# Patient Record
Sex: MALE | Race: ASIAN | NOT HISPANIC OR LATINO
[De-identification: names, ages, dates, MRNs, and addresses within clinical notes are randomized per-mention and may not be internally consistent; named-entity substitution may affect disease eponyms.]

---

## 2021-01-01 ENCOUNTER — LABORATORY RESULT (OUTPATIENT)
Age: 71
End: 2021-01-01

## 2021-01-01 ENCOUNTER — OUTPATIENT (OUTPATIENT)
Dept: OUTPATIENT SERVICES | Facility: HOSPITAL | Age: 71
LOS: 1 days | End: 2021-01-01

## 2021-01-01 ENCOUNTER — RESULT REVIEW (OUTPATIENT)
Age: 71
End: 2021-01-01

## 2021-01-01 ENCOUNTER — APPOINTMENT (OUTPATIENT)
Dept: HEART AND VASCULAR | Facility: CLINIC | Age: 71
End: 2021-01-01
Payer: MEDICAID

## 2021-01-01 ENCOUNTER — NON-APPOINTMENT (OUTPATIENT)
Age: 71
End: 2021-01-01

## 2021-01-01 ENCOUNTER — APPOINTMENT (OUTPATIENT)
Dept: FAMILY MEDICINE | Facility: CLINIC | Age: 71
End: 2021-01-01
Payer: MEDICAID

## 2021-01-01 ENCOUNTER — APPOINTMENT (OUTPATIENT)
Dept: RADIOLOGY | Facility: CLINIC | Age: 71
End: 2021-01-01
Payer: MEDICAID

## 2021-01-01 ENCOUNTER — APPOINTMENT (OUTPATIENT)
Dept: RHEUMATOLOGY | Facility: CLINIC | Age: 71
End: 2021-01-01
Payer: MEDICAID

## 2021-01-01 VITALS
TEMPERATURE: 97.5 F | OXYGEN SATURATION: 98 % | HEIGHT: 68 IN | BODY MASS INDEX: 24.86 KG/M2 | DIASTOLIC BLOOD PRESSURE: 81 MMHG | WEIGHT: 164 LBS | HEART RATE: 77 BPM | SYSTOLIC BLOOD PRESSURE: 134 MMHG

## 2021-01-01 VITALS
BODY MASS INDEX: 23.95 KG/M2 | HEART RATE: 61 BPM | SYSTOLIC BLOOD PRESSURE: 132 MMHG | HEIGHT: 68 IN | DIASTOLIC BLOOD PRESSURE: 82 MMHG | TEMPERATURE: 97.9 F | WEIGHT: 158 LBS | OXYGEN SATURATION: 96 %

## 2021-01-01 VITALS
HEART RATE: 85 BPM | WEIGHT: 158 LBS | TEMPERATURE: 97.5 F | OXYGEN SATURATION: 100 % | HEIGHT: 68 IN | SYSTOLIC BLOOD PRESSURE: 144 MMHG | BODY MASS INDEX: 23.95 KG/M2 | DIASTOLIC BLOOD PRESSURE: 90 MMHG

## 2021-01-01 DIAGNOSIS — Z86.73 PERSONAL HISTORY OF TRANSIENT ISCHEMIC ATTACK (TIA), AND CEREBRAL INFARCTION W/OUT RESIDUAL DEFICITS: ICD-10-CM

## 2021-01-01 DIAGNOSIS — Z00.00 ENCOUNTER FOR GENERAL ADULT MEDICAL EXAMINATION W/OUT ABNORMAL FINDINGS: ICD-10-CM

## 2021-01-01 LAB
25(OH)D3 SERPL-MCNC: 22.4 NG/ML
ALBUMIN SERPL ELPH-MCNC: 4.3 G/DL
ALP BLD-CCNC: 105 U/L
ALT SERPL-CCNC: 21 U/L
ANION GAP SERPL CALC-SCNC: 15 MMOL/L
AST SERPL-CCNC: 19 U/L
BASOPHILS # BLD AUTO: 0.06 K/UL
BASOPHILS NFR BLD AUTO: 0.9 %
BILIRUB SERPL-MCNC: 0.3 MG/DL
BUN SERPL-MCNC: 24 MG/DL
CALCIUM SERPL-MCNC: 9.6 MG/DL
CCP AB SER IA-ACNC: 154 UNITS
CHLORIDE SERPL-SCNC: 104 MMOL/L
CHOLEST SERPL-MCNC: 201 MG/DL
CO2 SERPL-SCNC: 21 MMOL/L
CREAT SERPL-MCNC: 1.54 MG/DL
EOSINOPHIL # BLD AUTO: 0.89 K/UL
EOSINOPHIL NFR BLD AUTO: 13.4 %
ESTIMATED AVERAGE GLUCOSE: 120 MG/DL
GLUCOSE SERPL-MCNC: 100 MG/DL
HBA1C MFR BLD HPLC: 5.8 %
HCT VFR BLD CALC: 40.6 %
HDLC SERPL-MCNC: 41 MG/DL
HGB BLD-MCNC: 12.7 G/DL
LDLC SERPL CALC-MCNC: 119 MG/DL
LYMPHOCYTES # BLD AUTO: 0.71 K/UL
LYMPHOCYTES NFR BLD AUTO: 10.7 %
MAN DIFF?: NORMAL
MCHC RBC-ENTMCNC: 31.3 GM/DL
MCHC RBC-ENTMCNC: 33.6 PG
MCV RBC AUTO: 107.4 FL
MONOCYTES # BLD AUTO: 0.53 K/UL
MONOCYTES NFR BLD AUTO: 8 %
NEUTROPHILS # BLD AUTO: 4.47 K/UL
NEUTROPHILS NFR BLD AUTO: 67 %
NONHDLC SERPL-MCNC: 160 MG/DL
PLATELET # BLD AUTO: 352 K/UL
POTASSIUM SERPL-SCNC: 4.8 MMOL/L
PROT SERPL-MCNC: 7.2 G/DL
RBC # BLD: 3.78 M/UL
RBC # FLD: 15.5 %
RF+CCP IGG SER-IMP: ABNORMAL
RHEUMATOID FACT SER QL: 50 IU/ML
SODIUM SERPL-SCNC: 139 MMOL/L
TRIGL SERPL-MCNC: 203 MG/DL
TSH SERPL-ACNC: 0.66 UIU/ML
WBC # FLD AUTO: 6.67 K/UL

## 2021-01-01 PROCEDURE — 99204 OFFICE O/P NEW MOD 45 MIN: CPT

## 2021-01-01 PROCEDURE — 99387 INIT PM E/M NEW PAT 65+ YRS: CPT | Mod: 25

## 2021-01-01 PROCEDURE — 99204 OFFICE O/P NEW MOD 45 MIN: CPT | Mod: 25

## 2021-01-01 PROCEDURE — 73120 X-RAY EXAM OF HAND: CPT | Mod: 26,50

## 2021-01-01 PROCEDURE — 93000 ELECTROCARDIOGRAM COMPLETE: CPT

## 2021-01-01 PROCEDURE — 99407 BEHAV CHNG SMOKING > 10 MIN: CPT

## 2021-01-01 PROCEDURE — G0439: CPT | Mod: 25

## 2021-01-01 RX ORDER — GLUC/MSM/COLGN2/HYAL/ANTIARTH3 375-375-20
TABLET ORAL
Refills: 0 | Status: ACTIVE | COMMUNITY

## 2021-01-01 RX ORDER — SODIUM BICARBONATE
POWDER (GRAM) MISCELLANEOUS
Refills: 0 | Status: ACTIVE | COMMUNITY

## 2021-01-01 RX ORDER — WALKER
EACH MISCELLANEOUS
Qty: 1 | Refills: 0 | Status: ACTIVE | COMMUNITY
Start: 2021-01-01 | End: 1900-01-01

## 2021-01-01 RX ORDER — TRAMADOL HYDROCHLORIDE 25 MG/1
TABLET, COATED ORAL
Refills: 0 | Status: ACTIVE | COMMUNITY

## 2021-01-01 RX ORDER — METHOTREXATE 2.5 MG/1
2.5 TABLET ORAL
Refills: 0 | Status: DISCONTINUED | COMMUNITY
End: 2021-01-01

## 2021-11-18 PROBLEM — Z86.73 HISTORY OF STROKE IN ADULTHOOD: Status: ACTIVE | Noted: 2021-01-01

## 2021-11-18 PROBLEM — Z00.00 ENCOUNTER FOR PREVENTIVE HEALTH EXAMINATION: Status: ACTIVE | Noted: 2021-01-01

## 2021-11-18 NOTE — COUNSELING
[Behavioral health counseling provided] : Behavioral health counseling provided [Risk of tobacco use and health benefits of smoking cessation discussed] : Risk of tobacco use and health benefits of smoking cessation discussed [Cessation strategies including cessation program discussed] : Cessation strategies including cessation program discussed [Use of nicotine replacement therapies and other medications discussed] : Use of nicotine replacement therapies and other medications discussed [Encouraged to pick a quit date and identify support needed to quit] : Encouraged to pick a quit date and identify support needed to quit [Tobacco Use Cessation Intensive Greater Than 10 Minutes] : Tobacco Use Cessation Intensive Greater Than 10 Minutes [FreeTextEntry3] : 10

## 2021-11-18 NOTE — PHYSICAL EXAM
[Normal Sclera/Conjunctiva] : normal sclera/conjunctiva [Normal Outer Ear/Nose] : the outer ears and nose were normal in appearance [Normal] : affect was normal and insight and judgment were intact [de-identified] : with ostomy bag on left side, c/d/i around ostomy site

## 2021-11-18 NOTE — HEALTH RISK ASSESSMENT
[Good] : ~his/her~  mood as  good [] : Yes [20 or more] : 20 or more [1 or 2 (0 pts)] : 1 or 2 (0 points) [Never (0 pts)] : Never (0 points) [No] : In the past 12 months have you used drugs other than those required for medical reasons? No [0] : 2) Feeling down, depressed, or hopeless: Not at all (0) [PHQ-2 Negative - No further assessment needed] : PHQ-2 Negative - No further assessment needed [Patient declined colonoscopy] : Patient declined colonoscopy [HIV test declined] : HIV test declined [Hepatitis C test declined] : Hepatitis C test declined [None] : None [With Family] : lives with family [Retired] : retired [Single] : single [Feels Safe at Home] : Feels safe at home [Fully functional (bathing, dressing, toileting, transferring, walking, feeding)] : Fully functional (bathing, dressing, toileting, transferring, walking, feeding) [Fully functional (using the telephone, shopping, preparing meals, housekeeping, doing laundry, using] : Fully functional and needs no help or supervision to perform IADLs (using the telephone, shopping, preparing meals, housekeeping, doing laundry, using transportation, managing medications and managing finances) [YearQuit] : 4 [de-identified] : limited  [de-identified] : fruits, veggies  [YFC1Eeibr] : 0 [Change in mental status noted] : No change in mental status noted [Language] : denies difficulty with language [Reports changes in hearing] : Reports no changes in hearing [Reports changes in vision] : Reports no changes in vision

## 2021-11-18 NOTE — HISTORY OF PRESENT ILLNESS
[FreeTextEntry1] : establish care  [de-identified] : 69 yo m presents to establish care. \par In April 2020, distended stomach, went to hospital for sx in [facility]. \par Patient has been following with provider from [facility]--Dr. Holman, hx of colon cancer with lung spots, colostomy bag. Was found to have liver and abdominal lesions. Was told metastasized. CEA levels increased starting in August in [facility], [place].  \par In need of routine blood work. Chemo is planned for starting 11/29 with [facility]. \par Patient needs port placed with [facility] for chemo. \par [facility] encouraged patient to stop smoking, ambulate more, and eat better. 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 \par \par Patient presents with sister Mi and daughter Yessi.

## 2021-11-18 NOTE — PLAN
[FreeTextEntry1] : follow up labs, labs drawn in office \par needs transportation forms \par will send labs to oncology \par

## 2021-12-07 NOTE — REASON FOR VISIT
[Symptom and Test Evaluation] : symptom and test evaluation [Family Member] : family member [FreeTextEntry1] : 71 year old man presents to initiate care. Patient [place] [facility] As part of a work up, he was told that he had a possible AAA. We are uncertain of the size. Imaging was done elsewhere. He is hypertensive on arrival. EKG is remarkable for SR with frequent PVCs. We are discussing a cardiac work up at this time.

## 2021-12-07 NOTE — DISCUSSION/SUMMARY
[FreeTextEntry1] : Borderline HTN - AMBROCIO  and I had an extensive discussion regarding his blood pressure management. Patient will continue taking current medications in addition to maintaining a low Na diet, with periodic b/p checks at home.\par PVCs check echocardiogram \par AAA will check u/s AAA screen \par EKG section of the chart --- secondary to symptoms above an electrocardiogram also known as an EKG was performed.  Risks and benefits discussed with the patient. Patient was given time and privacy to changed into a gown. Shortly after, standard 10 leads were applied and a Konokopia system was used to perform the study. The results were subsequently reviewed by attending physician and discussed with the patient. The study showed a normal sinus rhythm and no ST-T suggestive of ischemia.PVCs noted  Order for the EKG was placed in the chart. The results were documented. Billing submitted. Emotional support provided.\par

## 2021-12-09 NOTE — ASSESSMENT
[FreeTextEntry1] : 71 year old man with history of RA, diagnosed in the [facility], previously treated with MTX 20 mg qweek.  Patient is currently being treated with chemotherapy at [facility] for metastatic colon cancer and methotrexate is currently being held.  Patient is currently asymptomatic in terms of arthritis, will continue to hold MTX at this time.  will check RF and anti CCP ab today.  WIll also check xrays of the hands to evaluate for erosive changes.  Possible addition of plaquenil if develops increasing pain or stiffness although arthritis will likely be treated with chemotherapy agents as well.

## 2021-12-09 NOTE — ASSESSMENT
[FreeTextEntry1] : 71 year old man with history of RA, diagnosed in the [facility], previously treated with MTX 20 mg qweek.  Patient is currently being [place] [facility] [place] and methotrexate is currently being held.  Patient is currently asymptomatic in terms of arthritis, will continue to hold MTX at this time.  will check RF and anti CCP ab today.  WIll also check xrays of the hands to evaluate for erosive changes.  Possible addition of plaquenil if develops increasing pain or stiffness although arthritis will likely be treated with chemotherapy agents as well.

## 2021-12-09 NOTE — PHYSICAL EXAM
[General Appearance - Alert] : alert [General Appearance - In No Acute Distress] : in no acute distress [Sclera] : the sclera and conjunctiva were normal [] : no respiratory distress [Respiration, Rhythm And Depth] : normal respiratory rhythm and effort [Exaggerated Use Of Accessory Muscles For Inspiration] : no accessory muscle use [Edema] : there was no peripheral edema [Oriented To Time, Place, And Person] : oriented to person, place, and time [Impaired Insight] : insight and judgment were intact [Affect] : the affect was normal [FreeTextEntry1] : right 4th DIP with deviation, nontender.  Right IP joint enlargement of the first finger, no tenderness.  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  Decreased ROM of the left hip, although difficult to assess if related to weakness or joint symptoms at this time.

## 2021-12-09 NOTE — HISTORY OF PRESENT ILLNESS
[FreeTextEntry1] : 71 year old man with history of RA, diagnosed in the [facility] about two years ago\par Patient is here to establish care. \par Previously was treated with MTX 10 mg twice per week for a couple of years\par Patient recent ly started treated for colon cancer, as per PMD note:\par In April 2020, distended stomach, went to hospital for sx in [facility]. \par Patient has been following with provider from [facility]--Dr. Holman, hx of colon cancer with lung spots, colostomy bag. Was found to have liver and abdominal lesions. Was told metastasized. CEA levels increased starting in August in [facility], [place]. \par \par Now on chemotherapy for colon cancer received first dose 11/29/2021\par 5 FU, irinotecan, leucovorin\par Goal every two weeks, 6 sessions\par \par No methotrexate at present\par No joint pain at present\par When weather is cold, feels increased pain\par Also on tramadol for pain, has not taken recently\par \par No recent xrays of joints\par No joint surgeries \par \par When had pain, present in the hips and twitching  in the left leg\par Patient with residual left lower extremity weakness secondary to previous surgery\par Initially diagnosed due to pain, had right 4th DIp and right thumb pain with joint changes\par No previous arthrocentesis, no joint surgeries in past\par Walks with cane when outside, not in the house for balance given previous CVA, not for joint pain\par \par Daughter also on speaker phone during visit at request of patient.

## 2021-12-09 NOTE — REVIEW OF SYSTEMS
[Negative] : Heme/Lymph [Joint Pain] : no joint pain [FreeTextEntry7] : right sided colostomy in place [FreeTextEntry9] : no joint pains or stiffness at this time

## 2021-12-09 NOTE — HISTORY OF PRESENT ILLNESS
[FreeTextEntry1] : 71 year old man with history of RA, diagnosed in the [facility] about two years ago\par Patient is here to establish care. \par Previously was treated with MTX 10 mg twice per week for a couple of years\par Patient recent ly started treated for colon cancer, as per PMD note:\par In April 2020, distended stomach, went to hospital for sx in [facility]. \par Patient has been [place] [facility], hx of colon cancer with lung spots, colostomy bag. Was found to have liver and abdominal lesions. Was told metastasized. CEA levels increased starting in August in [facility], [place]. \par \par Now on chemotherapy for colon cancer received first dose 11/29/2021\par 5 FU, irinotecan, leucovorin\par Goal every two weeks, 6 sessions\par \par No methotrexate at present\par No joint pain at present\par When weather is cold, feels increased pain\par Also on tramadol for pain, has not taken recently\par \par No recent xrays of joints\par No joint surgeries \par \par When had pain, present in the hips and twitching  in the left leg\par Patient with residual left lower extremity weakness secondary to previous surgery\par Initially diagnosed due to pain, had right 4th DIp and right thumb pain with joint changes\par No previous arthrocentesis, no joint surgeries in past\par Walks with cane when outside, not in the house for balance given previous CVA, not for joint pain\par \par Daughter also on speaker phone during visit at request of patient.

## 2022-01-01 ENCOUNTER — LABORATORY RESULT (OUTPATIENT)
Age: 72
End: 2022-01-01

## 2022-01-01 ENCOUNTER — APPOINTMENT (OUTPATIENT)
Dept: HEART AND VASCULAR | Facility: CLINIC | Age: 72
End: 2022-01-01
Payer: MEDICAID

## 2022-01-01 ENCOUNTER — APPOINTMENT (OUTPATIENT)
Dept: HEART AND VASCULAR | Facility: CLINIC | Age: 72
End: 2022-01-01

## 2022-01-01 ENCOUNTER — APPOINTMENT (OUTPATIENT)
Dept: FAMILY MEDICINE | Facility: CLINIC | Age: 72
End: 2022-01-01
Payer: MEDICAID

## 2022-01-01 ENCOUNTER — NON-APPOINTMENT (OUTPATIENT)
Age: 72
End: 2022-01-01

## 2022-01-01 ENCOUNTER — APPOINTMENT (OUTPATIENT)
Dept: FAMILY MEDICINE | Facility: CLINIC | Age: 72
End: 2022-01-01

## 2022-01-01 ENCOUNTER — APPOINTMENT (OUTPATIENT)
Dept: NEPHROLOGY | Facility: CLINIC | Age: 72
End: 2022-01-01
Payer: MEDICAID

## 2022-01-01 ENCOUNTER — APPOINTMENT (OUTPATIENT)
Dept: RHEUMATOLOGY | Facility: CLINIC | Age: 72
End: 2022-01-01
Payer: MEDICAID

## 2022-01-01 ENCOUNTER — APPOINTMENT (OUTPATIENT)
Dept: HEMATOLOGY ONCOLOGY | Facility: CLINIC | Age: 72
End: 2022-01-01
Payer: MEDICAID

## 2022-01-01 VITALS
BODY MASS INDEX: 22.73 KG/M2 | SYSTOLIC BLOOD PRESSURE: 140 MMHG | TEMPERATURE: 97.1 F | OXYGEN SATURATION: 98 % | WEIGHT: 150 LBS | HEART RATE: 64 BPM | HEIGHT: 68 IN | DIASTOLIC BLOOD PRESSURE: 78 MMHG

## 2022-01-01 VITALS
TEMPERATURE: 97.3 F | DIASTOLIC BLOOD PRESSURE: 102 MMHG | BODY MASS INDEX: 19.7 KG/M2 | WEIGHT: 130 LBS | OXYGEN SATURATION: 97 % | SYSTOLIC BLOOD PRESSURE: 141 MMHG | HEART RATE: 103 BPM | HEIGHT: 68 IN

## 2022-01-01 VITALS
HEIGHT: 68 IN | BODY MASS INDEX: 24.25 KG/M2 | WEIGHT: 160 LBS | TEMPERATURE: 97.2 F | HEART RATE: 43 BPM | SYSTOLIC BLOOD PRESSURE: 144 MMHG | OXYGEN SATURATION: 98 % | DIASTOLIC BLOOD PRESSURE: 73 MMHG

## 2022-01-01 VITALS
TEMPERATURE: 97.5 F | BODY MASS INDEX: 23.49 KG/M2 | WEIGHT: 155 LBS | SYSTOLIC BLOOD PRESSURE: 138 MMHG | OXYGEN SATURATION: 98 % | HEART RATE: 82 BPM | DIASTOLIC BLOOD PRESSURE: 79 MMHG | HEIGHT: 68 IN

## 2022-01-01 VITALS
BODY MASS INDEX: 22.29 KG/M2 | SYSTOLIC BLOOD PRESSURE: 142 MMHG | OXYGEN SATURATION: 99 % | WEIGHT: 147.06 LBS | HEIGHT: 68 IN | HEART RATE: 68 BPM | TEMPERATURE: 97.8 F | DIASTOLIC BLOOD PRESSURE: 75 MMHG

## 2022-01-01 VITALS — DIASTOLIC BLOOD PRESSURE: 76 MMHG | HEART RATE: 50 BPM | SYSTOLIC BLOOD PRESSURE: 153 MMHG

## 2022-01-01 VITALS
HEIGHT: 68 IN | OXYGEN SATURATION: 98 % | DIASTOLIC BLOOD PRESSURE: 77 MMHG | HEART RATE: 68 BPM | SYSTOLIC BLOOD PRESSURE: 132 MMHG | BODY MASS INDEX: 23.95 KG/M2 | WEIGHT: 158 LBS

## 2022-01-01 VITALS
DIASTOLIC BLOOD PRESSURE: 96 MMHG | TEMPERATURE: 98 F | HEART RATE: 66 BPM | WEIGHT: 130 LBS | SYSTOLIC BLOOD PRESSURE: 141 MMHG | OXYGEN SATURATION: 100 % | HEIGHT: 68 IN | BODY MASS INDEX: 19.7 KG/M2

## 2022-01-01 VITALS
HEART RATE: 64 BPM | HEIGHT: 68 IN | WEIGHT: 160 LBS | OXYGEN SATURATION: 99 % | BODY MASS INDEX: 24.25 KG/M2 | DIASTOLIC BLOOD PRESSURE: 70 MMHG | SYSTOLIC BLOOD PRESSURE: 122 MMHG

## 2022-01-01 VITALS
WEIGHT: 154 LBS | BODY MASS INDEX: 23.34 KG/M2 | HEIGHT: 68 IN | OXYGEN SATURATION: 98 % | SYSTOLIC BLOOD PRESSURE: 136 MMHG | DIASTOLIC BLOOD PRESSURE: 71 MMHG | TEMPERATURE: 97.5 F | HEART RATE: 51 BPM

## 2022-01-01 VITALS — SYSTOLIC BLOOD PRESSURE: 114 MMHG | HEART RATE: 58 BPM | DIASTOLIC BLOOD PRESSURE: 74 MMHG

## 2022-01-01 VITALS
SYSTOLIC BLOOD PRESSURE: 136 MMHG | HEIGHT: 68 IN | OXYGEN SATURATION: 98 % | DIASTOLIC BLOOD PRESSURE: 65 MMHG | HEART RATE: 45 BPM | BODY MASS INDEX: 24.25 KG/M2 | WEIGHT: 160 LBS

## 2022-01-01 VITALS — DIASTOLIC BLOOD PRESSURE: 77 MMHG | SYSTOLIC BLOOD PRESSURE: 130 MMHG

## 2022-01-01 VITALS — TEMPERATURE: 97.1 F

## 2022-01-01 DIAGNOSIS — R53.1 WEAKNESS: ICD-10-CM

## 2022-01-01 DIAGNOSIS — K94.19 OTHER COMPLICATIONS OF ENTEROSTOMY: ICD-10-CM

## 2022-01-01 DIAGNOSIS — N18.30 CHRONIC KIDNEY DISEASE, STAGE 3 UNSPECIFIED: ICD-10-CM

## 2022-01-01 DIAGNOSIS — T45.1X5A AGRANULOCYTOSIS SECONDARY TO CANCER CHEMOTHERAPY: ICD-10-CM

## 2022-01-01 DIAGNOSIS — I10 ESSENTIAL (PRIMARY) HYPERTENSION: ICD-10-CM

## 2022-01-01 DIAGNOSIS — D70.1 AGRANULOCYTOSIS SECONDARY TO CANCER CHEMOTHERAPY: ICD-10-CM

## 2022-01-01 DIAGNOSIS — M19.90 UNSPECIFIED OSTEOARTHRITIS, UNSPECIFIED SITE: ICD-10-CM

## 2022-01-01 DIAGNOSIS — E87.2 ACIDOSIS: ICD-10-CM

## 2022-01-01 DIAGNOSIS — Z86.79 PERSONAL HISTORY OF OTHER DISEASES OF THE CIRCULATORY SYSTEM: ICD-10-CM

## 2022-01-01 DIAGNOSIS — C78.7 MALIGNANT NEOPLASM OF COLON, UNSPECIFIED: ICD-10-CM

## 2022-01-01 DIAGNOSIS — F17.200 NICOTINE DEPENDENCE, UNSPECIFIED, UNCOMPLICATED: ICD-10-CM

## 2022-01-01 DIAGNOSIS — D72.819 DECREASED WHITE BLOOD CELL COUNT, UNSPECIFIED: ICD-10-CM

## 2022-01-01 DIAGNOSIS — D47.2 MONOCLONAL GAMMOPATHY: ICD-10-CM

## 2022-01-01 DIAGNOSIS — M06.9 RHEUMATOID ARTHRITIS, UNSPECIFIED: ICD-10-CM

## 2022-01-01 DIAGNOSIS — D64.9 ANEMIA, UNSPECIFIED: ICD-10-CM

## 2022-01-01 DIAGNOSIS — C18.9 MALIGNANT NEOPLASM OF COLON, UNSPECIFIED: ICD-10-CM

## 2022-01-01 DIAGNOSIS — I49.3 VENTRICULAR PREMATURE DEPOLARIZATION: ICD-10-CM

## 2022-01-01 LAB
ALBUMIN MFR SERPL ELPH: 55.8 %
ALBUMIN MFR SERPL ELPH: 57 %
ALBUMIN SERPL ELPH-MCNC: 4.1 G/DL
ALBUMIN SERPL ELPH-MCNC: 4.4 G/DL
ALBUMIN SERPL ELPH-MCNC: 4.6 G/DL
ALBUMIN SERPL-MCNC: 3.8 G/DL
ALBUMIN SERPL-MCNC: 4.3 G/DL
ALBUMIN/GLOB SERPL: 1.3 RATIO
ALBUMIN/GLOB SERPL: 1.3 RATIO
ALBUPE: 5.3 %
ALP BLD-CCNC: 119 U/L
ALP BLD-CCNC: 89 U/L
ALP BLD-CCNC: 94 U/L
ALPHA1 GLOB MFR SERPL ELPH: 4.6 %
ALPHA1 GLOB MFR SERPL ELPH: 4.7 %
ALPHA1 GLOB SERPL ELPH-MCNC: 0.3 G/DL
ALPHA1 GLOB SERPL ELPH-MCNC: 0.3 G/DL
ALPHA1UPE: 32.9 %
ALPHA2 GLOB MFR SERPL ELPH: 10.8 %
ALPHA2 GLOB MFR SERPL ELPH: 11 %
ALPHA2 GLOB SERPL ELPH-MCNC: 0.7 G/DL
ALPHA2 GLOB SERPL ELPH-MCNC: 0.8 G/DL
ALPHA2UPE: 21.6 %
ALT SERPL-CCNC: 10 U/L
ALT SERPL-CCNC: 13 U/L
ALT SERPL-CCNC: 16 U/L
ANION GAP SERPL CALC-SCNC: 13 MMOL/L
ANION GAP SERPL CALC-SCNC: 14 MMOL/L
ANION GAP SERPL CALC-SCNC: 17 MMOL/L
ANISOCYTOSIS BLD QL: SLIGHT
APPEARANCE: CLEAR
AST SERPL-CCNC: 20 U/L
AST SERPL-CCNC: 21 U/L
AST SERPL-CCNC: 26 U/L
B-GLOBULIN MFR SERPL ELPH: 14.4 %
B-GLOBULIN MFR SERPL ELPH: 14.5 %
B-GLOBULIN SERPL ELPH-MCNC: 1 G/DL
B-GLOBULIN SERPL ELPH-MCNC: 1.1 G/DL
B2 MICROGLOB SERPL-MCNC: 3.4 MG/L
BASOPHILS # BLD AUTO: 0.01 K/UL
BASOPHILS # BLD AUTO: 0.03 K/UL
BASOPHILS NFR BLD AUTO: 0.3 %
BASOPHILS NFR BLD AUTO: 0.9 %
BETAUPE: 25.1 %
BILIRUB SERPL-MCNC: 0.4 MG/DL
BILIRUB SERPL-MCNC: 0.4 MG/DL
BILIRUB SERPL-MCNC: 0.8 MG/DL
BILIRUBIN URINE: NEGATIVE
BLOOD URINE: NEGATIVE
BUN SERPL-MCNC: 17 MG/DL
BUN SERPL-MCNC: 27 MG/DL
BUN SERPL-MCNC: 29 MG/DL
CALCIUM SERPL-MCNC: 9.3 MG/DL
CALCIUM SERPL-MCNC: 9.4 MG/DL
CALCIUM SERPL-MCNC: 9.6 MG/DL
CHLORIDE SERPL-SCNC: 104 MMOL/L
CHLORIDE SERPL-SCNC: 105 MMOL/L
CHLORIDE SERPL-SCNC: 106 MMOL/L
CO2 SERPL-SCNC: 16 MMOL/L
CO2 SERPL-SCNC: 23 MMOL/L
CO2 SERPL-SCNC: 23 MMOL/L
COLOR: NORMAL
CREAT SERPL-MCNC: 1.3 MG/DL
CREAT SERPL-MCNC: 1.32 MG/DL
CREAT SERPL-MCNC: 1.71 MG/DL
CREAT SPEC-SCNC: 189 MG/DL
CRP SERPL-MCNC: 4 MG/L
CYSTATIN C SERPL-MCNC: 1.8 MG/L
DEPRECATED KAPPA LC FREE/LAMBDA SER: 0.88 RATIO
DEPRECATED KAPPA LC FREE/LAMBDA SER: 0.88 RATIO
DEPRECATED KAPPA LC FREE/LAMBDA SER: 0.94 RATIO
EGFR: 57 ML/MIN/1.73M2
EGFR: 58 ML/MIN/1.73M2
EOSINOPHIL # BLD AUTO: 0.05 K/UL
EOSINOPHIL # BLD AUTO: 0.05 K/UL
EOSINOPHIL # BLD AUTO: 0.06 K/UL
EOSINOPHIL # BLD AUTO: 0.13 K/UL
EOSINOPHIL NFR BLD AUTO: 1.8 %
EOSINOPHIL NFR BLD AUTO: 1.8 %
EOSINOPHIL NFR BLD AUTO: 2 %
EOSINOPHIL NFR BLD AUTO: 4.5 %
GAMMA GLOB FLD ELPH-MCNC: 1 G/DL
GAMMA GLOB FLD ELPH-MCNC: 1 G/DL
GAMMA GLOB MFR SERPL ELPH: 13.1 %
GAMMA GLOB MFR SERPL ELPH: 14.1 %
GAMMAUPE: 15.1 %
GFR/BSA.PRED SERPLBLD CYS-BASED-ARV: 34 ML/MIN/1.73M2
GIANT PLATELETS BLD QL SMEAR: PRESENT
GLUCOSE QUALITATIVE U: NEGATIVE
GLUCOSE SERPL-MCNC: 113 MG/DL
GLUCOSE SERPL-MCNC: 96 MG/DL
GLUCOSE SERPL-MCNC: 99 MG/DL
HCT VFR BLD CALC: 37.1 %
HCT VFR BLD CALC: 37.6 %
HCT VFR BLD CALC: 40.1 %
HGB BLD-MCNC: 12 G/DL
HGB BLD-MCNC: 12 G/DL
HGB BLD-MCNC: 12.8 G/DL
HYPOCHROMIA BLD QL SMEAR: SLIGHT
IGA 24H UR QL IFE: NORMAL
IGA SER QL IEP: 471 MG/DL
IGA SER QL IEP: 490 MG/DL
IGG SER QL IEP: 883 MG/DL
IGG SER QL IEP: 976 MG/DL
IGM SER QL IEP: 106 MG/DL
IGM SER QL IEP: 91 MG/DL
IMM GRANULOCYTES NFR BLD AUTO: 0.3 %
INTERPRETATION SERPL IEP-IMP: NORMAL
INTERPRETATION SERPL IEP-IMP: NORMAL
KAPPA LC 24H UR QL: NORMAL
KAPPA LC CSF-MCNC: 3.91 MG/DL
KAPPA LC CSF-MCNC: 3.91 MG/DL
KAPPA LC CSF-MCNC: 4.07 MG/DL
KAPPA LC SERPL-MCNC: 3.44 MG/DL
KAPPA LC SERPL-MCNC: 3.44 MG/DL
KAPPA LC SERPL-MCNC: 3.83 MG/DL
KETONES URINE: NEGATIVE
LDH SERPL-CCNC: 467 U/L
LEUKOCYTE ESTERASE URINE: NEGATIVE
LYMPHOCYTES # BLD AUTO: 0.69 K/UL
LYMPHOCYTES # BLD AUTO: 0.69 K/UL
LYMPHOCYTES # BLD AUTO: 0.9 K/UL
LYMPHOCYTES # BLD AUTO: 0.93 K/UL
LYMPHOCYTES NFR BLD AUTO: 22.8 %
LYMPHOCYTES NFR BLD AUTO: 22.8 %
LYMPHOCYTES NFR BLD AUTO: 31.2 %
LYMPHOCYTES NFR BLD AUTO: 31.2 %
M PROTEIN MFR SERPL ELPH: NORMAL
M PROTEIN MFR SERPL ELPH: NORMAL
M PROTEIN SPEC IFE-MCNC: NORMAL
M PROTEIN SPEC IFE-MCNC: NORMAL
MACROCYTES BLD QL SMEAR: SLIGHT
MAGNESIUM SERPL-MCNC: 2.4 MG/DL
MAN DIFF?: NORMAL
MCHC RBC-ENTMCNC: 31.1 PG
MCHC RBC-ENTMCNC: 31.9 GM/DL
MCHC RBC-ENTMCNC: 31.9 GM/DL
MCHC RBC-ENTMCNC: 32.3 GM/DL
MCHC RBC-ENTMCNC: 32.3 PG
MCHC RBC-ENTMCNC: 33.8 PG
MCV RBC AUTO: 101.1 FL
MCV RBC AUTO: 104.5 FL
MCV RBC AUTO: 97.3 FL
MICROALBUMIN 24H UR DL<=1MG/L-MCNC: 1.7 MG/DL
MICROALBUMIN/CREAT 24H UR-RTO: 9 MG/G
MONOCLON BAND OBS SERPL: NORMAL
MONOCLON BAND OBS SERPL: NORMAL
MONOCYTES # BLD AUTO: 0.08 K/UL
MONOCYTES # BLD AUTO: 0.08 K/UL
MONOCYTES # BLD AUTO: 0.26 K/UL
MONOCYTES # BLD AUTO: 0.47 K/UL
MONOCYTES NFR BLD AUTO: 15.8 %
MONOCYTES NFR BLD AUTO: 2.6 %
MONOCYTES NFR BLD AUTO: 2.6 %
MONOCYTES NFR BLD AUTO: 8.9 %
MSMEAR: NORMAL
NEUTROPHILS # BLD AUTO: 1.36 K/UL
NEUTROPHILS # BLD AUTO: 1.5 K/UL
NEUTROPHILS # BLD AUTO: 2.18 K/UL
NEUTROPHILS # BLD AUTO: 2.18 K/UL
NEUTROPHILS NFR BLD AUTO: 47.3 %
NEUTROPHILS NFR BLD AUTO: 50.4 %
NEUTROPHILS NFR BLD AUTO: 62.3 %
NEUTROPHILS NFR BLD AUTO: 62.3 %
NEUTS BAND NFR BLD MANUAL: 9.6 %
NITRITE URINE: NEGATIVE
OVALOCYTES BLD QL SMEAR: SLIGHT
PH URINE: 6
PHOSPHATE SERPL-MCNC: 3.6 MG/DL
PHOSPHATE SERPL-MCNC: 3.7 MG/DL
PLAT MORPH BLD: ABNORMAL
PLATELET # BLD AUTO: 245 K/UL
PLATELET # BLD AUTO: 250 K/UL
PLATELET # BLD AUTO: 251 K/UL
POIKILOCYTOSIS BLD QL SMEAR: SLIGHT
POLYCHROMASIA BLD QL SMEAR: SLIGHT
POTASSIUM SERPL-SCNC: 4.1 MMOL/L
POTASSIUM SERPL-SCNC: 4.4 MMOL/L
POTASSIUM SERPL-SCNC: 4.6 MMOL/L
PROT PATTERN 24H UR ELPH-IMP: NORMAL
PROT SERPL-MCNC: 6.7 G/DL
PROT SERPL-MCNC: 6.8 G/DL
PROT SERPL-MCNC: 6.8 G/DL
PROT SERPL-MCNC: 7.1 G/DL
PROT SERPL-MCNC: 7.4 G/DL
PROT SERPL-MCNC: 7.5 G/DL
PROT SERPL-MCNC: 7.5 G/DL
PROT UR-MCNC: 38 MG/DL
PROT UR-MCNC: 38 MG/DL
PROTEIN URINE: ABNORMAL
RBC # BLD: 3.55 M/UL
RBC # BLD: 3.55 M/UL
RBC # BLD: 3.72 M/UL
RBC # BLD: 4.12 M/UL
RBC # FLD: 15.6 %
RBC # FLD: 17.1 %
RBC # FLD: 17.2 %
RBC BLD AUTO: ABNORMAL
RETICS # AUTO: 1 %
RETICS AGGREG/RBC NFR: 34.8 K/UL
SCHISTOCYTES BLD QL SMEAR: SLIGHT
SODIUM SERPL-SCNC: 137 MMOL/L
SODIUM SERPL-SCNC: 140 MMOL/L
SODIUM SERPL-SCNC: 142 MMOL/L
SPECIFIC GRAVITY URINE: 1.02
SPHEROCYTES BLD QL SMEAR: SLIGHT
UROBILINOGEN URINE: NORMAL
WBC # FLD AUTO: 2.88 K/UL
WBC # FLD AUTO: 2.98 K/UL
WBC # FLD AUTO: 3.03 K/UL

## 2022-01-01 PROCEDURE — 93979 VASCULAR STUDY: CPT

## 2022-01-01 PROCEDURE — 99213 OFFICE O/P EST LOW 20 MIN: CPT

## 2022-01-01 PROCEDURE — 99214 OFFICE O/P EST MOD 30 MIN: CPT | Mod: 25

## 2022-01-01 PROCEDURE — 93306 TTE W/DOPPLER COMPLETE: CPT

## 2022-01-01 PROCEDURE — 99204 OFFICE O/P NEW MOD 45 MIN: CPT | Mod: 25

## 2022-01-01 PROCEDURE — 99214 OFFICE O/P EST MOD 30 MIN: CPT

## 2022-01-01 PROCEDURE — 99213 OFFICE O/P EST LOW 20 MIN: CPT | Mod: 25

## 2022-01-01 PROCEDURE — 93000 ELECTROCARDIOGRAM COMPLETE: CPT

## 2022-01-01 PROCEDURE — 99204 OFFICE O/P NEW MOD 45 MIN: CPT

## 2022-01-01 RX ORDER — PROTECTIVES, O.U.
SWAB, MEDICATED TOPICAL
Qty: 3 | Refills: 3 | Status: ACTIVE | COMMUNITY
Start: 2022-01-01 | End: 1900-01-01

## 2022-01-01 RX ORDER — WHEELCHAIR
EACH MISCELLANEOUS
Qty: 1 | Refills: 0 | Status: ACTIVE | COMMUNITY
Start: 2022-01-01 | End: 1900-01-01

## 2022-01-01 RX ORDER — WALKER
EACH MISCELLANEOUS
Qty: 1 | Refills: 0 | Status: ACTIVE | COMMUNITY
Start: 2022-01-01 | End: 1900-01-01

## 2022-01-01 RX ORDER — OSTOMY SUPPLY 4" X 4"
EACH MISCELLANEOUS
Qty: 3 | Refills: 3 | Status: ACTIVE | COMMUNITY
Start: 2022-01-01 | End: 1900-01-01

## 2022-01-01 RX ORDER — BEVACIZUMAB 400 MG/16ML
INJECTION, SOLUTION INTRAVENOUS
Refills: 0 | Status: ACTIVE | COMMUNITY

## 2022-01-01 RX ORDER — SODIUM BICARBONATE 650 MG/1
650 TABLET ORAL
Qty: 180 | Refills: 3 | Status: ACTIVE | COMMUNITY
Start: 2022-01-01

## 2022-01-01 RX ORDER — ATORVASTATIN CALCIUM 40 MG/1
40 TABLET, FILM COATED ORAL
Refills: 0 | Status: ACTIVE | COMMUNITY

## 2022-01-01 RX ORDER — AMLODIPINE BESYLATE 5 MG/1
5 TABLET ORAL DAILY
Qty: 90 | Refills: 3 | Status: ACTIVE | COMMUNITY
Start: 2022-01-01 | End: 1900-01-01

## 2022-01-01 RX ORDER — NICOTINE 4 MG/1
10 INHALANT RESPIRATORY (INHALATION)
Qty: 1 | Refills: 5 | Status: ACTIVE | COMMUNITY
Start: 2022-01-01 | End: 1900-01-01

## 2022-01-01 RX ORDER — OSTOMY SUPPLY 1"
EACH MISCELLANEOUS
Qty: 1 | Refills: 3 | Status: ACTIVE | COMMUNITY
Start: 2022-01-01 | End: 1900-01-01

## 2022-01-01 RX ORDER — ASPIRIN 81 MG
81 TABLET, DELAYED RELEASE (ENTERIC COATED) ORAL
Refills: 0 | Status: COMPLETED | COMMUNITY
End: 2022-01-01

## 2022-01-01 RX ORDER — KETOROLAC TROMETHAMINE 10 MG/1
10 TABLET, FILM COATED ORAL
Qty: 10 | Refills: 0 | Status: ACTIVE | COMMUNITY
Start: 2022-01-01 | End: 1900-01-01

## 2022-01-04 NOTE — DISCUSSION/SUMMARY
[FreeTextEntry1] : AAA screen shows no sings of issues\par Frequent PVCS will check 7 day holter, given the fact that he will be getting medication that will prolong QT interval \par RTC 4 weeks to discuss results

## 2022-01-04 NOTE — REASON FOR VISIT
[Symptom and Test Evaluation] : symptom and test evaluation [FreeTextEntry1] : 71 year old man presents for a follow up and re-evaluation.. Patient is undergoing CA treatment at [facility]. He is coming along with his treatment. He completed an echocardiogram and abdominal u/s AAA screening. We are discussing results. Interestingly, frequent PVCs were noted during the test. \par

## 2022-02-04 NOTE — ASSESSMENT
[FreeTextEntry1] : # Stage 3 CKD.\par * This is likeliest related to aging, vascular disease, smoking, possibly oxalate deposition, and possibly oxaliplatin.\par * Recheck labs, including cystatin C, monoclonal protein evaluation, urinalysis, and urine albumin quantification.\par * A point of care renal ultrasound using the Butterfly iQ was performed and the images were personally reviewed. (The patient understands that this was a brief study to evaluate for hydronephrosis and not a complete renal ultrasound.) The ultrasound showed no significant hydronephrosis, normal echogenicity. \par * They will forward recent CT scan to me. No reported obstruction. \par * Therapies for kidney disease: blood pressure control; other evidence-based therapies including exercise, a plant-based lower oxalate diet, and 400 mcg folic acid daily\par * Cardiovascular disease prevention: counseling on healthy diet, physical activity, weight loss, alcohol limitation, blood pressure control; cardiology evaluation/followup advised\par * A counseling information sheet has been given (today or previously). All their questions were answered.\par * The patient has been counseled that chronic kidney disease is a significant condition and regular office followup with me (at least every 1 months for now) is important for monitoring and their health, and that it is their responsibility to make a follow up appointment.\par * The patient has been counseled never to stop taking their medications without discussing it with me or another doctor.\par * The patient has been counseled on avoiding NSAIDs.\par * The patient has been counseled on risk of acute renal failure and instructed to immediately call and speak with me or go immediately to ER with any severe symptoms, nausea, vomiting, diarrhea, chest pain, or shortness of breath.\par \par # Smoking.\par * Stop smoking.\par \par # HTN controlled, but he will be treated with avstatin.\par * The patient's blood pressure was checked with the Omron [id number]-907XL using the SPRINT trial protocol after sitting quietly in an empty room with arm supported, back supported, and feet on the floor for 5 minutes. The average of 3 readings were taken.\par * A counseling information sheet has been given (today). All their questions were answered.\par * The patient has been counseled to check their BP at home with an automatic arm cuff, write down the readings, and reach me directly on the phone immediately if they are persistently > 180 systolic or if SBP is less than 100 or if lightheadedness develops. They were counseled to bring in all blood pressure readings and medications next visit.\par * The patient has been counseled that regular office followup (at least every 1 month for now)  is important for monitoring and for their health, and that it is their responsibility to make follow up appointments.\par * The patient also has been counseled that they must never stop or change any medications without discussing this with me (or another physician). \par \par

## 2022-02-04 NOTE — HISTORY OF PRESENT ILLNESS
[FreeTextEntry1] : Kindly referred for CKD by Dr. J Carlos Jacobsen.. A  was offered and he declined. His sister was here and also was provided counseling. I also spoke with his daughter on the phone. \par \par Labs reviewed. Creatinine 1.54 eGFR 54. Creatinine was elevated at 125 micromol/L on 10/21. He has a right hemicolectomy with ileostomy. The patient denies exposure to chronic NSAIDs, chronic PPIs, green smoothies, creatine, or herbal supplements. The patient denies a history of kidney stones or pyelonephritis. No HTN or DM.  They are unaware of proteinuria or hematuria.\par \par He received xelox (including oxaliplatin) in [facility] in August 2020. \par \par He still smokes "but doesn’t inhale".

## 2022-02-09 PROBLEM — Z86.79 HISTORY OF ABDOMINAL AORTIC ANEURYSM (AAA): Status: RESOLVED | Noted: 2021-01-01 | Resolved: 2022-01-01

## 2022-02-09 NOTE — DISCUSSION/SUMMARY
[FreeTextEntry1] : PVCs echo reviewed and reassuring, a bit concerned about bradycardia to start AV chrissy blocking agent. Will have Dr Dahl evaluate \par Borderline HTN - AMBROCIO  and I had an extensive discussion regarding his blood pressure management. Patient will continue taking current medications in addition to maintaining a low Na diet, with periodic b/p checks at home.\par TORRIE ALBRECHT and I discussed his lipid panel and individualized target LDL goal. At this point, will do diet and exercise with anticipation of re-evaluating labs in 3-6 months\par

## 2022-02-25 NOTE — CARDIOLOGY SUMMARY
[de-identified] : NSR, PVC burden 20% with 1 dominant morphology [de-identified] : EF 55-60%, normal LA size, mild MR

## 2022-02-25 NOTE — HISTORY OF PRESENT ILLNESS
[FreeTextEntry1] : Mr. Awad is a 71 year-old male with hypertension, rheumatoid arthritis, CKD, and colon cancer with mets to the liver who presents to discuss PVC management.  He reports a history of palpitations that occur only during eating.  He has no associated symptoms, including chest pain, dizziness, SOB, LE edema, orthopnea, PND, and syncope.  He reports a reduced exercise tolerance since his cancer diagnosis and initiation of chemotherapy.  He uses a cane to ambulate.

## 2022-02-25 NOTE — PHYSICAL EXAM
[Well Developed] : well developed [Well Nourished] : well nourished [No Acute Distress] : no acute distress [Normal Conjunctiva] : normal conjunctiva [Normal Venous Pressure] : normal venous pressure [No Carotid Bruit] : no carotid bruit [Normal S1, S2] : normal S1, S2 [No Rub] : no rub [No Murmur] : no murmur [No Gallop] : no gallop [Premature Beats] : regular with premature beats [Clear Lung Fields] : clear lung fields [Good Air Entry] : good air entry [No Respiratory Distress] : no respiratory distress  [Soft] : abdomen soft [Non Tender] : non-tender [No Masses/organomegaly] : no masses/organomegaly [Normal Bowel Sounds] : normal bowel sounds [Normal Gait] : normal gait [No Edema] : no edema [No Cyanosis] : no cyanosis [No Clubbing] : no clubbing [No Varicosities] : no varicosities [No Rash] : no rash [No Skin Lesions] : no skin lesions [Moves all extremities] : moves all extremities [No Focal Deficits] : no focal deficits [Normal Speech] : normal speech [Alert and Oriented] : alert and oriented [Normal memory] : normal memory

## 2022-02-25 NOTE — DISCUSSION/SUMMARY
[FreeTextEntry1] : Mr. Awad is a 71 year-old male with metastatic colon cancer with frequent PVCs (20% burden).  He is minimally symptomatic at this point and I would be reluctant to start AAD therapy given his liver mets and compromised kidney function.  As such, I will contact his oncologist to discuss prognosis.  We will likely opt for symptom control with AV chrissy agents.  He will follow-up in 2 months or sooner if needed.

## 2022-03-10 PROBLEM — F17.200 CURRENT EVERY DAY SMOKER: Status: ACTIVE | Noted: 2021-01-01

## 2022-03-10 NOTE — HISTORY OF PRESENT ILLNESS
[FreeTextEntry1] : smoking \par palpitations \par kidney follow up  [de-identified] : 70 yo m presents to discuss his recent follow ups. \par Mentioned he has seen cardio and EP, follow up pending next month. \par Was found to have PVCs. Will determine need for txt over the course of the next month. \par \par Still smoking 1-2 per day. \par \par Followed with kidney doctor, was started on bicarb. BP has been stable. \par \par Chemo pending-- new type, mentioned liver numbers and tumor size has increased. Side effects of new med may affect kidney and blood pressure. Will cont. to monitor. \par \par Eating well, weight stable. \par

## 2022-03-10 NOTE — PLAN
[FreeTextEntry1] : kidney function (ckd 3)\par reviewed kidney function with patient \par encouraged stop smoking \par reviewed notes with patient and recent cmp and cbc \par \par smoking \par encouraged stopping cig smoking \par \par PVCs\par reviewed cardio and EP notes with patient \par \par bp\par stable \par encouraged low salt diet with exercise \par \par cholesterol \par will hold off on repeat labs for now, repeat in 3 months \par encouraged low fat diet with exericse

## 2022-03-15 NOTE — REVIEW OF SYSTEMS
Continued Stay Note  [facility]     Patient Name: Marlena Navarrete  MRN: [id number]  Today's Date: 3/15/2022    Admit Date: 3/9/2022     Discharge Plan     Row Name 03/15/22 1219       Plan    Plan Comments Spoke with patient via room phone. She did not understand why she needed rehab at [place]. Reviewed therapy notes with her and discussed safety at home. She is agreeable to go to rehab and she understands if she does not want to stay in rehab she can leave. Updated nurse. Oxana JIMENEZ RN [facility]    Row Name 03/15/22 1202       Plan    Plan [facility]    Patient/Family in Agreement with Plan yes    Plan Comments Per Cayden they can accept and will have a bed today. MD updated and patient DC'd. Spoke with patient's spouse and he will provide transportation. Partial packet at the station with the nurse. Cayden updated the spouse will be bringing the patient this afternoon. Oxana JIMENEZ RN [facility]               Discharge Codes    No documentation.               Expected Discharge Date and Time     Expected Discharge Date Expected Discharge Time    Mar 15, 2022             Oxana Prakash RN     [Negative] : Heme/Lymph

## 2022-03-17 PROBLEM — E87.2 ACIDOSIS: Status: ACTIVE | Noted: 2022-01-01

## 2022-03-17 NOTE — ASSESSMENT
[FreeTextEntry1] : \par # Stage 3 CKD.\par * This is likeliest related to aging, vascular disease, smoking, possibly oxalate deposition, and possibly oxaliplatin.\par * Recheck labs.\par * Therapies for kidney disease: blood pressure control; other evidence-based therapies including exercise, a plant-based lower oxalate diet, and 400 mcg folic acid daily\par * Cardiovascular disease prevention: counseling on healthy diet, physical activity, weight loss, alcohol limitation, blood pressure control; cardiology evaluation/followup advised\par * A counseling information sheet has been given (today or previously). All their questions were answered.\par * The patient has been counseled that chronic kidney disease is a significant condition and regular office followup with me (at least every 1 months for now) is important for monitoring and their health, and that it is their responsibility to make a follow up appointment.\par * The patient has been counseled never to stop taking their medications without discussing it with me or another doctor.\par * The patient has been counseled on avoiding NSAIDs.\par * The patient has been counseled on risk of acute renal failure and instructed to immediately call and speak with me or go immediately to ER with any severe symptoms, nausea, vomiting, diarrhea, chest pain, or shortness of breath.\par \par # Smoking.\par * Stop smoking.\par \par # Acidosis.\par * Cont bicarb. \par \par # HTN uncontrolled.\par * Start amlodipine 5 mg. (He has previously been on this and this is the preferred agent for avastin.)\par * The patient's blood pressure was checked with the Omron HEM-907XL using the SPRINT trial protocol after sitting quietly in an empty room with arm supported, back supported, and feet on the floor for 5 minutes. The average of 3 readings were taken.\par * A counseling information sheet has been given (today). All their questions were answered.\par * The patient has been counseled to check their BP at home with an automatic arm cuff, write down the readings, and reach me directly on the phone immediately if they are persistently > 180 systolic or if SBP is less than 100 or if lightheadedness develops. They were counseled to bring in all blood pressure readings and medications next visit.\par * The patient has been counseled that regular office followup (at least every 1 month for now) is important for monitoring and for their health, and that it is their responsibility to make follow up appointments.\par * The patient also has been counseled that they must never stop or change any medications without discussing this with me (or another physician).

## 2022-03-17 NOTE — HISTORY OF PRESENT ILLNESS
[FreeTextEntry1] : Kindly referred for CKD by Dr. J Carlos Jacobsen.. A  was offered and he declined. His sister was here and also was provided counseling. \par \par * CKD stable 1 month ago, eGFR 34 - 39. * Followed for RA and colon ca with liver mets. * Bicarb started for acidosis. * HTN uncontrolled. BP 130s - 150s at home. No lightheadedness. Compliant with medications. \par \par Previous history (04Feb22): Labs reviewed. Creatinine 1.54 eGFR 54. Creatinine was elevated at 125 micromol/L on 10/21. He has a right hemicolectomy with ileostomy. The patient denies exposure to chronic NSAIDs, chronic PPIs, green smoothies, creatine, or herbal supplements. The patient denies a history of kidney stones or pyelonephritis. No HTN or DM.  They are unaware of proteinuria or hematuria.\par \par He received xelox (including oxaliplatin) in [facility] in August 2020. \par \par He still smokes "but doesn’t inhale".

## 2022-03-17 NOTE — HISTORY OF PRESENT ILLNESS
[FreeTextEntry1] : 71 year old man with history of RA, diagnosed in the [facility] about two years ago\par Patient is here to establish care. \par Previously was treated with MTX 10 mg twice per week for a couple of years\par Patient recently started treatment for colon cancer, as per PMD note:\par In April 2020, distended stomach, went to hospital for sx in [facility]. \par Patient has been [place] [facility], hx of colon cancer with lung spots, colostomy bag. Was found to have liver and abdominal lesions. Was told metastasized. CEA levels increased starting in August in [facility], [place]. \par \par Now on chemotherapy for colon cancer received first dose 11/29/2021\par 5 FU, irinotecan, leucovorin\par Goal every two weeks, 6 sessions\par \par No methotrexate at present\par No joint pain at present\par When weather is cold, feels increased pain\par Also on tramadol for pain, has not taken recently\par \par No recent xrays of joints\par No joint surgeries \par \par When had pain, present in the hips and twitching  in the left leg\par Patient with residual left lower extremity weakness secondary to previous surgery\par Initially diagnosed due to pain, had right 4th DIp and right thumb pain with joint changes\par No previous arthrocentesis, no joint surgeries in past\par Walks with cane when outside, not in the house for balance given previous CVA, not for joint pain\par \par Daughter also on speaker phone during visit at request of patient.\par \par March 17, 2022\par Patient returns for follow up\par Feeling well in terms of joint symptoms, no joint pain, swelling or morning stiffness\par Walking for exercise, weather permitting\par Patient continues chemotherapy for metastatic colon cancer, tolerating well\par Has follow up after next dose of chemo to reevaluate response\par Reviewed xrays with patient \par Reviewed previous labs as well, +RF, +CCP\par

## 2022-03-17 NOTE — PHYSICAL EXAM
[General Appearance - Alert] : alert [General Appearance - In No Acute Distress] : in no acute distress [General Appearance - Well-Appearing] : healthy appearing [Sclera] : the sclera and conjunctiva were normal [] : no respiratory distress [Respiration, Rhythm And Depth] : normal respiratory rhythm and effort [Exaggerated Use Of Accessory Muscles For Inspiration] : no accessory muscle use [Abnormal Walk] : normal gait [Edema] : there was no peripheral edema [Nail Clubbing] : no clubbing  or cyanosis of the fingernails [Musculoskeletal - Swelling] : no joint swelling seen [Motor Tone] : muscle strength and tone were normal [FreeTextEntry1] : No active synovitis of the upper and lower extremities bilaterally.

## 2022-03-17 NOTE — ASSESSMENT
[FreeTextEntry1] : 71 year old man with history of RA, +RF, +CCP, diagnosed in the [facility], previously treated with MTX 20 mg qweek.  Patient is currently being treated with chemotherapy at [facility] for metastatic colon cancer and methotrexate is currently being held.  Patient is currently asymptomatic in terms of arthritis, will continue to hold MTX at this time.  Xrays of the hands reviewed with patient.  Possible addition of plaquenil if develops increasing pain or stiffness although arthritis will likely be treated with chemotherapy agents as well.  Follow up in three months or sooner if symptoms change or worsen.

## 2022-04-11 NOTE — DISCUSSION/SUMMARY
[FreeTextEntry1] : Mr. Awad is a 71 year-old male with metastatic colon cancer with frequent PVCs (20% burden).  He is minimally symptomatic at this point and I would be reluctant to start AAD therapy given his liver mets and compromised kidney function.  I have discussed his prognosis with his oncology team.  We will await to see his response to his newest chemotherapeutic regimen prior to consider ablative therapy.  In the interim, we will continue with symptom control with AV chrissy agents.  He will follow-up in 3 months or sooner if needed.

## 2022-04-11 NOTE — PHYSICAL EXAM
[Well Developed] : well developed [Well Nourished] : well nourished [No Acute Distress] : no acute distress [Normal Conjunctiva] : normal conjunctiva [Normal Venous Pressure] : normal venous pressure [No Carotid Bruit] : no carotid bruit [Normal S1, S2] : normal S1, S2 [No Murmur] : no murmur [No Rub] : no rub [No Gallop] : no gallop [Premature Beats] : regular with premature beats [Clear Lung Fields] : clear lung fields [Good Air Entry] : good air entry [No Respiratory Distress] : no respiratory distress  [Soft] : abdomen soft [Non Tender] : non-tender [No Masses/organomegaly] : no masses/organomegaly [Normal Bowel Sounds] : normal bowel sounds [Normal Gait] : normal gait [No Edema] : no edema [No Cyanosis] : no cyanosis [No Clubbing] : no clubbing [No Varicosities] : no varicosities [No Rash] : no rash [No Skin Lesions] : no skin lesions [Moves all extremities] : moves all extremities [No Focal Deficits] : no focal deficits [Normal Speech] : normal speech [Alert and Oriented] : alert and oriented [Normal memory] : normal memory

## 2022-04-11 NOTE — CARDIOLOGY SUMMARY
[de-identified] : NSR, PVC burden 20% with 1 dominant morphology [de-identified] : EF 55-60%, normal LA size, mild MR

## 2022-04-21 PROBLEM — D70.1 LEUKOPENIA DUE TO ANTINEOPLASTIC CHEMOTHERAPY: Status: ACTIVE | Noted: 2022-01-01

## 2022-04-21 PROBLEM — D72.819 LEUKOPENIA: Status: ACTIVE | Noted: 2022-01-01

## 2022-04-21 PROBLEM — D64.9 ANEMIA: Status: ACTIVE | Noted: 2022-01-01

## 2022-04-21 NOTE — REASON FOR VISIT
[Initial Consultation] : an initial consultation for [FreeTextEntry2] : Leukopenia, chemotherapy induced leukopenia, anemia

## 2022-04-21 NOTE — PHYSICAL EXAM
[Normal] : affect appropriate [de-identified] : Mild conjunctival pallor [de-identified] : RRR, S1, S2, ?S3 [de-identified] : Right-sided colostomy [de-identified] : Hyperpigmentation of both hands, palmar erythema of both hands

## 2022-04-21 NOTE — CONSULT LETTER
[Dear  ___] : Dear  [unfilled], [Consult Letter:] : I had the pleasure of evaluating your patient, [unfilled]. [( Thank you for referring [unfilled] for consultation for _____ )] : Thank you for referring [unfilled] for consultation for [unfilled] [Please see my note below.] : Please see my note below. [Consult Closing:] : Thank you very much for allowing me to participate in the care of this patient.  If you have any questions, please do not hesitate to contact me. [Sincerely,] : Sincerely, [FreeTextEntry3] : Maribel Nunez

## 2022-04-21 NOTE — RESULTS/DATA
[FreeTextEntry1] : The patient is a 71-year-old male with a history of hypertension, stroke (2013), osteoarthritis, renal insufficiency, colon cancer first diagnosed in the [facility] in April 2020, status post colon resection and right-sided colostomy, treated at the time with Xeloda and oxaliplatin, found to have metastasis to the liver in November 2021 and has been treated with a regimen that includes infusional chemotherapy and Avastin who was referred for evaluation of leukopenia.  The leukopenia is most likely chemotherapy induced as the agents used to treat metastatic colon cancer are known to cause cytopenias.  The addition of Avastin to the patient's chemotherapy regimen is also contributing to the leukopenia.  By patient's history he has not required an interruption of therapy as his absolute neutrophil count has not fallen below a certain critical level.  His mild anemia may also be chemotherapy-induced as well as due to metastatic cancer/chronic illness.  The weak IgG G lambda band with a hardly detectable M spike is likely not contributing to the leukopenia and mild anemia.  Have ordered a CBC, manual differential,  reticulocyte count, protein electrophoresis, immunoelectrophoresis with immunofixation, free kappa light chains and kappa to lambda ratio..  The patient will call the office for his results.  As the patient receives treatment every 2 weeks by an oncologist at [facility], his blood counts will be closely monitored by his treating physician.

## 2022-04-21 NOTE — REVIEW OF SYSTEMS
[Fatigue] : fatigue [Joint Pain] : joint pain [Joint Stiffness] : joint stiffness [Negative] : Allergic/Immunologic [Fever] : no fever [Chills] : no chills [Night Sweats] : no night sweats [Recent Change In Weight] : ~T no recent weight change [Chest Pain] : no chest pain [Shortness Of Breath] : no shortness of breath [Abdominal Pain] : no abdominal pain [Skin Rash] : no skin rash [Easy Bleeding] : no tendency for easy bleeding [Easy Bruising] : no tendency for easy bruising [FreeTextEntry9] : joint pain attributed to arthritis [de-identified] : darkening of hands and redness attributed to chemotherapy

## 2022-04-21 NOTE — HISTORY OF PRESENT ILLNESS
[de-identified] : The patient is a 71-year-old male with a history of hypertension, stroke (2013), osteoarthritis, renal insufficiency, colon cancer first diagnosed in the [facility] in April 2020, status post colon resection and right-sided colostomy, treated at the time with Xeloda and oxaliplatin, found to have metastasis to the liver in November 2021 who was referred for evaluation of leukopenia.  Prior to starting chemotherapy for metastasis to the liver in November 2021, the patient had a CBC which revealed a normal total white blood count of 6.67, hemoglobin of 12.7 and a platelet count of 352,000.  The absolute neutrophil count at that time was normal and patient had a mild lymphopenia.  He began treatment at [facility] with a "cocktail" of chemotherapy (specific drugs not known to patient) on November 29 and proceeded to receive this regimen (likely including 5-fluorouracil and possibly oxaliplatin or irinotecan) every 2 weeks up until February 2022 when his CT scan revealed progression of disease.  The patient states that he did receive a blood count each time prior to each treatment, none of which were deferred due to critically low blood counts.  On February 7, 2022, Avastin was added to his chemotherapy regimen.  In mid March CBC revealed a white count of 2.88, with an absolute neutrophil count of 1360, hemoglobin of 12.0, hematocrit 37.6 and a platelet count of 251,000.  In February the patient was also found to have a weak IgG lambda band on immunoelectrophoresis and an M spike that was too low to be quantitated..  He has received 6 sessions of Avastin thus far which have been fairly well-tolerated.  The patient's last course of chemotherapy/Avastin was April 18 through April 20 with a 48-hour continuous infusion of one of his drugs.  A recent CT scan revealed stable disease without progression.  The patient complains of discoloration of his hands which she attributes to the chemotherapy he receives.  He also has  arthritic type pain.  The patient denies fever, shaking rigors, shortness of breath, abdominal pain, frequent infections, bleeding, or excessive bruising.

## 2022-05-11 PROBLEM — D47.2 IGG MONOCLONAL GAMMOPATHY OF UNCERTAIN SIGNIFICANCE: Status: ACTIVE | Noted: 2022-01-01

## 2022-05-11 PROBLEM — I10 BENIGN ESSENTIAL HYPERTENSION: Status: ACTIVE | Noted: 2021-01-01

## 2022-05-11 PROBLEM — N18.30 STAGE 3 CHRONIC KIDNEY DISEASE: Status: ACTIVE | Noted: 2022-01-01

## 2022-05-11 PROBLEM — I49.3 FREQUENT PVCS: Status: ACTIVE | Noted: 2021-01-01

## 2022-05-11 NOTE — REASON FOR VISIT
[Symptom and Test Evaluation] : symptom and test evaluation [Hypertension] : hypertension [FreeTextEntry1] : 71 year old man presents for a follow up and re-evaluation.. Patient is undergoing colon CA treatment at [facility]. He is coming along with his treatment. He completed an echocardiogram and abdominal u/s AAA screening. We are discussing results. Interestingly, frequent PVCs were noted during the test. No syncope. He completed a holter monitor with significant PVC burdern. \par

## 2022-05-11 NOTE — REASON FOR VISIT
[Symptom and Test Evaluation] : symptom and test evaluation [Hypertension] : hypertension [FreeTextEntry1] : 71 year old man presents for a follow up and re-evaluation.. [place] [facility] He is coming along with his treatment. He completed an echocardiogram and abdominal u/s AAA screening. We are discussing results. Interestingly, frequent PVCs were noted during the test. No syncope. He completed a holter monitor with significant PVC burdern. \par

## 2022-05-11 NOTE — DISCUSSION/SUMMARY
[FreeTextEntry1] : HTN - AMBROCIO  and I had an extensive discussion regarding his blood pressure management. Patient will continue taking current medications in addition to maintaining a low Na diet, with periodic b/p checks at home.\par PVCs and palps Inclined towards a conservative follow up in this patient. We had a careful discussion regarding diet and exercise. Will be happy to re-evaluate. Follow up pending with Dr Dahl\par Carotid u/s if able to approve.

## 2022-05-11 NOTE — REASON FOR VISIT
[Symptom and Test Evaluation] : symptom and test evaluation [Hypertension] : hypertension [FreeTextEntry1] : 71 year old man presents for a follow up and re-evaluation.. Patient [place] [facility] He is coming along with his treatment. He completed an echocardiogram and abdominal u/s AAA screening. We are discussing results. Interestingly, frequent PVCs were noted during the test. No syncope. He completed a holter monitor with significant PVC burdern. \par

## 2022-05-11 NOTE — ASSESSMENT
[FreeTextEntry1] : \par # Stage 3 CKD.\par * This is likeliest related to aging, vascular disease, smoking, possibly oxalate deposition, and possibly oxaliplatin.\par * Recheck labs next visit. \par * Therapies for kidney disease: blood pressure control; other evidence-based therapies including exercise, a plant-based lower oxalate diet, and 400 mcg folic acid daily\par * Cardiovascular disease prevention: counseling on healthy diet, physical activity, weight loss, alcohol limitation, blood pressure control; cardiology evaluation/followup advised\par * A counseling information sheet has been given (today or previously). All their questions were answered.\par * The patient has been counseled that chronic kidney disease is a significant condition and regular office followup with me (at least every 3 months for now) is important for monitoring and their health, and that it is their responsibility to make a follow up appointment.\par * The patient has been counseled never to stop taking their medications without discussing it with me or another doctor.\par * The patient has been counseled on avoiding NSAIDs.\par * The patient has been counseled on risk of acute renal failure and instructed to immediately call and speak with me or go immediately to ER with any severe symptoms, nausea, vomiting, diarrhea, chest pain, or shortness of breath.\par \par # Smoking.\par * Stop smoking.\par \par # Acidosis.\par * Cont bicarb. \par \par # HTN better controlled. \par * Cont amlodipine. \par * The patient's blood pressure was checked with the [employer] [id number]-[id number] using the SPRINT trial protocol after sitting quietly in an empty room with arm supported, back supported, and feet on the floor for 5 minutes. The average of 3 readings were taken.\par * A counseling information sheet has been given (today). All their questions were answered.\par * The patient has been counseled to check their BP at home with an automatic arm cuff, write down the readings, and reach me directly on the phone immediately if they are persistently > 180 systolic or if SBP is less than 100 or if lightheadedness develops. They were counseled to bring in all blood pressure readings and medications next visit.\par * The patient has been counseled that regular office followup (at least every 3 month for now) is important for monitoring and for their health, and that it is their responsibility to make follow up appointments.\par * The patient also has been counseled that they must never stop or change any medications without discussing this with me (or another physician).

## 2022-05-11 NOTE — REASON FOR VISIT
[Symptom and Test Evaluation] : symptom and test evaluation [Hypertension] : hypertension [FreeTextEntry1] : 71 year old man presents for a follow up and re-evaluation.. Patient is undergoing colon CA treatment at [facility]. He is coming along with his treatment. He completed an echocardiogram and abdominal u/s AAA screening. We are discussing results. Interestingly, frequent PVCs were noted during the test. No syncope. He completed a holter monitor with significant PVC burdern. \par

## 2022-05-11 NOTE — HISTORY OF PRESENT ILLNESS
[FreeTextEntry1] : Kindly referred for CKD by Dr. J Carlos Jacobsen.. A  was offered and he declined. His sister was here and also was provided counseling. \par \par * Evaluated by Dr. Nunez for leukopenia, anemia, monoclonal gammopathy. [place] [facility] [place] liver mets.  * CKD stable, creatinine 1.32. * * HTN controlled. BP 120s at home. No lightheadedness. No CP/SOB. Compliant with medications. * Occasional left lower lumbosacral discomfort without fever, dysuria. Clearly related to to moving around. (Muscular.)\par \par Previous history (17Mar22): * CKD stable 1 month ago, eGFR 34 - 39. * Followed for RA and colon ca with liver mets. * Bicarb started for acidosis. * HTN uncontrolled. BP 130s - 150s at home. No lightheadedness. Compliant with medications. \par \par Previous history (04Feb22): Labs reviewed. Creatinine 1.54 eGFR 54. Creatinine was elevated at 125 micromol/L on 10/21. He has a right hemicolectomy with ileostomy. The patient denies exposure to chronic NSAIDs, chronic PPIs, green smoothies, creatine, or herbal supplements. The patient denies a history of kidney stones or pyelonephritis. No HTN or DM.  They are unaware of proteinuria or hematuria.\par \par He received xelox (including oxaliplatin) in [facility] in August 2020. \par \par He still smokes "but doesn’t inhale".

## 2022-05-17 PROBLEM — R53.1 INCREASED WEAKNESS WHEN AMBULATING: Status: ACTIVE | Noted: 2022-01-01

## 2022-05-19 PROBLEM — K94.19 ALTERED BOWEL ELIMINATION DUE TO INTESTINAL OSTOMY: Status: ACTIVE | Noted: 2022-01-01

## 2022-06-23 PROBLEM — M19.90 OSTEOARTHRITIS: Status: ACTIVE | Noted: 2022-01-01

## 2022-06-23 PROBLEM — M06.9 RHEUMATOID ARTHRITIS, ADULT: Status: ACTIVE | Noted: 2021-01-01

## 2022-06-23 NOTE — PHYSICAL EXAM
[General Appearance - Alert] : alert [Sclera] : the sclera and conjunctiva were normal [] : no respiratory distress [Respiration, Rhythm And Depth] : normal respiratory rhythm and effort [Exaggerated Use Of Accessory Muscles For Inspiration] : no accessory muscle use [Edema] : there was no peripheral edema [Musculoskeletal - Swelling] : no joint swelling seen [Oriented To Time, Place, And Person] : oriented to person, place, and time [Affect] : the affect was normal [Mood] : the mood was normal [FreeTextEntry1] : Slow ambulation with cane, no active synovitis of the joints of the upper and lower extremities

## 2022-06-23 NOTE — HISTORY OF PRESENT ILLNESS
[FreeTextEntry1] : 71 year old man with history of RA, diagnosed in the [facility] about two years ago\par Patient is here to establish care. \par Previously was treated with MTX 10 mg twice per week for a couple of years\par Patient recently started treatment for colon cancer, as per PMD note:\par In April 2020, distended stomach, went to hospital for sx in [facility]. \par Patient has been following with provider from [facility]--Dr. Holman, hx of colon cancer with lung spots, colostomy bag. Was found to have liver and abdominal lesions. Was told metastasized. CEA levels increased starting in August in [facility], [place]. \par \par Now on chemotherapy for colon cancer received first dose 11/29/2021\par 5 FU, irinotecan, leucovorin\par Goal every two weeks, 6 sessions\par \par No methotrexate at present\par No joint pain at present\par When weather is cold, feels increased pain\par Also on tramadol for pain, has not taken recently\par \par No recent xrays of joints\par No joint surgeries \par \par When had pain, present in the hips and twitching  in the left leg\par Patient with residual left lower extremity weakness secondary to previous surgery\par Initially diagnosed due to pain, had right 4th DIp and right thumb pain with joint changes\par No previous arthrocentesis, no joint surgeries in past\par Walks with cane when outside, not in the house for balance given previous CVA, not for joint pain\par \par Daughter also on speaker phone during visit at request of patient.\par \par March 17, 2022\par Patient returns for follow up\par Feeling well in terms of joint symptoms, no joint pain, swelling or morning stiffness\par Walking for exercise, weather permitting\par Patient continues chemotherapy for metastatic colon cancer, tolerating well\par Has follow up after next dose of chemo to reevaluate response\par Reviewed xrays with patient \par Reviewed previous labs as well, +RF, +CCP\par \par June 23, 2022\par Patient returns for follow-up of rheumatoid arthritis \par Since last visit patient's main concern related to back pain \par Given metastatic colon cancer, had full evaluation by [facility] for back pain \par Completed MRI, CT scan, PET scan for the pain\par Was told back pain not related to the cancer\par Seeing pain doctor, not from the nerve, PT twice per week, acupuncture but patient does not feel able to complete regular follow-ups due to the pain\par Lost 25 pounds since last visit, back pain limiting appetite\par Currently receiving chemotherapy, family members are going to speak with oncologist about trying to hold off on next dose due to weakness, drinking boost liquid and eating bananas and eggs\par Scheduled to see nutritionist next Tuesday by telehealth\par No joint pains at this time patient does not feel as though rheumatoid arthritis is active

## 2022-06-23 NOTE — REVIEW OF SYSTEMS
[Feeling Poorly] : feeling poorly [Feeling Tired] : feeling tired [Recent Weight Loss (___ Lbs)] : recent [unfilled] ~Ulb weight loss [Negative] : Heme/Lymph [Joint Pain] : no joint pain [Joint Swelling] : no joint swelling [Joint Stiffness] : no joint stiffness [FreeTextEntry9] : back pain

## 2022-06-23 NOTE — ASSESSMENT
[FreeTextEntry1] : 71 year old man with history of RA, +RF, +CCP, diagnosed in the [facility], previously treated with MTX 20 mg qweek.  Patient is currently being treated with chemotherapy at [facility] for metastatic colon cancer and methotrexate is currently being held.  Patient is currently asymptomatic in terms of arthritis, will continue to hold MTX at this time.  Xrays of the hands reviewed with patient.  Possible addition of plaquenil if develops increasing pain or stiffness although arthritis will likely be treated with chemotherapy agents as well.  Patient is following with pain management for low back pain, currently in physical therapy and acupuncture with minimal benefit to date.  Has appointment with nutritionist as well given weight loss.  Follow up in six months or sooner if symptoms change or worsen.

## 2022-07-07 PROBLEM — C18.9 COLON CANCER METASTASIZED TO LIVER: Status: ACTIVE | Noted: 2021-01-01

## 2022-07-07 NOTE — HISTORY OF PRESENT ILLNESS
[FreeTextEntry8] : cc: home health aide paperwork \par \par 70 yo m presents with daughter and grandson. \par In need of home health aide. \par Daughter mention he is weaker than he has been, not as mobile, not eating much throughout the day. \par She is trying to supplement him with boost. \par The aide would assist with eating, and ambulation. \par Chemo was stopped for weakness. \par Because of pain and weakness, lack of appetite, looking into hospice care. \par

## 2022-07-07 NOTE — PHYSICAL EXAM
[Normal Sclera/Conjunctiva] : normal sclera/conjunctiva [Normal Outer Ear/Nose] : the outer ears and nose were normal in appearance [Normal] : affect was normal and insight and judgment were intact [de-identified] : weakness, poor ambulation, poor rom

## 2022-07-07 NOTE — REVIEW OF SYSTEMS
[Fatigue] : fatigue [Recent Change In Weight] : ~T recent weight change [Back Pain] : back pain [FreeTextEntry7] : lack of appetite  [FreeTextEntry9] : weakness

## 2022-07-07 NOTE — PLAN
[FreeTextEntry1] : encouraged ambulation with walker \par encouraged eating when hungry, supp with boost \par will send in paper work for home health aide \par currently looking into hospice, working with case management \par follow up pending with oncology \par reviewed risks, benefits, alternatives, side effects, regimen to medication

## 2022-08-12 ENCOUNTER — APPOINTMENT (OUTPATIENT)
Dept: HEART AND VASCULAR | Facility: CLINIC | Age: 72
End: 2022-08-12

## 2022-08-12 ENCOUNTER — APPOINTMENT (OUTPATIENT)
Dept: NEPHROLOGY | Facility: CLINIC | Age: 72
End: 2022-08-12

## 2022-11-08 ENCOUNTER — APPOINTMENT (OUTPATIENT)
Dept: HEART AND VASCULAR | Facility: CLINIC | Age: 72
End: 2022-11-08

## 2022-12-13 ENCOUNTER — APPOINTMENT (OUTPATIENT)
Dept: RHEUMATOLOGY | Facility: CLINIC | Age: 72
End: 2022-12-13